# Patient Record
Sex: FEMALE
[De-identification: names, ages, dates, MRNs, and addresses within clinical notes are randomized per-mention and may not be internally consistent; named-entity substitution may affect disease eponyms.]

---

## 2017-10-04 ENCOUNTER — RX ONLY (RX ONLY)
Age: 42
End: 2017-10-04

## 2018-02-04 ENCOUNTER — RX ONLY (RX ONLY)
Age: 43
End: 2018-02-04

## 2021-04-22 ENCOUNTER — APPOINTMENT (RX ONLY)
Dept: URBAN - METROPOLITAN AREA CLINIC 168 | Facility: CLINIC | Age: 46
Setting detail: DERMATOLOGY
End: 2021-04-22

## 2021-04-22 VITALS — TEMPERATURE: 96.8 F

## 2021-04-22 DIAGNOSIS — L70.0 ACNE VULGARIS: ICD-10-CM

## 2021-04-22 DIAGNOSIS — L21.8 OTHER SEBORRHEIC DERMATITIS: ICD-10-CM

## 2021-04-22 PROCEDURE — 99204 OFFICE O/P NEW MOD 45 MIN: CPT

## 2021-04-22 PROCEDURE — ? COUNSELING

## 2021-04-22 PROCEDURE — ? FULL BODY SKIN EXAM - DECLINED

## 2021-04-22 PROCEDURE — ? PRESCRIPTION

## 2021-04-22 PROCEDURE — ? ADDITIONAL NOTES

## 2021-04-22 RX ORDER — HYDROCORTISONE 25 MG/G
CREAM TOPICAL BID
Qty: 1 | Refills: 6 | Status: ERX | COMMUNITY
Start: 2021-04-22

## 2021-04-22 RX ORDER — SODIUM SULFACETAMIDE AND SULFUR 80; 40 MG/ML; MG/ML
LOTION TOPICAL
Qty: 1 | Refills: 11 | Status: ERX | COMMUNITY
Start: 2021-04-22

## 2021-04-22 RX ORDER — KETOCONAZOLE 20 MG/G
CREAM TOPICAL BID
Qty: 1 | Refills: 6 | Status: ERX | COMMUNITY
Start: 2021-04-22

## 2021-04-22 RX ADMIN — KETOCONAZOLE: 20 CREAM TOPICAL at 00:00

## 2021-04-22 RX ADMIN — HYDROCORTISONE: 25 CREAM TOPICAL at 00:00

## 2021-04-22 RX ADMIN — SODIUM SULFACETAMIDE AND SULFUR: 80; 40 LOTION TOPICAL at 00:00

## 2021-04-22 NOTE — HPI: OTHER
Condition:: Seb derm
Please Describe Your Condition:: Eyebrows, around nose and mouth. X years. Uses hydrocortisone butyrate 1%. No help. Has had for years, cream not working. History of eczema and psoriasis.

## 2021-04-22 NOTE — PROCEDURE: ADDITIONAL NOTES
Additional Notes: Patient consent was obtained to proceed with the visit and recommended plan of care after discussion of all risks and benefits, including the risks of COVID-19 exposure.
Detail Level: Simple
Additional Notes: Uses head and shoulders on scalp. Instructed to let head and shoulders to let sit in hair 10 min then rinse out.
Render Risk Assessment In Note?: no

## 2021-04-22 NOTE — PROCEDURE: MIPS QUALITY
Quality 130: Documentation Of Current Medications In The Medical Record: Current Medications Documented
Quality 402: Tobacco Use And Help With Quitting Among Adolescents: Patient screened for tobacco and is an ex-smoker
Detail Level: Detailed
Quality 110: Preventive Care And Screening: Influenza Immunization: Influenza Immunization not Administered for Documented Reasons.
Quality 431: Preventive Care And Screening: Unhealthy Alcohol Use - Screening: Patient screened for unhealthy alcohol use using a single question and scores less than 2 times per year
Quality 226: Preventive Care And Screening: Tobacco Use: Screening And Cessation Intervention: Patient screened for tobacco use and is an ex/non-smoker

## 2021-04-22 NOTE — PROCEDURE: COUNSELING
Detail Level: Detailed
Erythromycin Pregnancy And Lactation Text: This medication is Pregnancy Category B and is considered safe during pregnancy. It is also excreted in breast milk.
Spironolactone Counseling: Patient advised regarding risks of diarrhea, abdominal pain, hyperkalemia, birth defects (for female patients), liver toxicity and renal toxicity. The patient may need blood work to monitor liver and kidney function and potassium levels while on therapy. The patient verbalized understanding of the proper use and possible adverse effects of spironolactone.  All of the patient's questions and concerns were addressed.
Topical Sulfur Applications Pregnancy And Lactation Text: This medication is Pregnancy Category C and has an unknown safety profile during pregnancy. It is unknown if this topical medication is excreted in breast milk.
Bactrim Counseling:  I discussed with the patient the risks of sulfa antibiotics including but not limited to GI upset, allergic reaction, drug rash, diarrhea, dizziness, photosensitivity, and yeast infections.  Rarely, more serious reactions can occur including but not limited to aplastic anemia, agranulocytosis, methemoglobinemia, blood dyscrasias, liver or kidney failure, lung infiltrates or desquamative/blistering drug rashes.
Benzoyl Peroxide Counseling: Patient counseled that medicine may cause skin irritation and bleach clothing.  In the event of skin irritation, the patient was advised to reduce the amount of the drug applied or use it less frequently.   The patient verbalized understanding of the proper use and possible adverse effects of benzoyl peroxide.  All of the patient's questions and concerns were addressed.
High Dose Vitamin A Pregnancy And Lactation Text: High dose vitamin A therapy is contraindicated during pregnancy and breast feeding.
Dapsone Counseling: I discussed with the patient the risks of dapsone including but not limited to hemolytic anemia, agranulocytosis, rashes, methemoglobinemia, kidney failure, peripheral neuropathy, headaches, GI upset, and liver toxicity.  Patients who start dapsone require monitoring including baseline LFTs and weekly CBCs for the first month, then every month thereafter.  The patient verbalized understanding of the proper use and possible adverse effects of dapsone.  All of the patient's questions and concerns were addressed.
Tazorac Counseling:  Patient advised that medication is irritating and drying.  Patient may need to apply sparingly and wash off after an hour before eventually leaving it on overnight.  The patient verbalized understanding of the proper use and possible adverse effects of tazorac.  All of the patient's questions and concerns were addressed.
Topical Sulfur Applications Counseling: Topical Sulfur Counseling: Patient counseled that this medication may cause skin irritation or allergic reactions.  In the event of skin irritation, the patient was advised to reduce the amount of the drug applied or use it less frequently.   The patient verbalized understanding of the proper use and possible adverse effects of topical sulfur application.  All of the patient's questions and concerns were addressed.
Sarecycline Pregnancy And Lactation Text: This medication is Pregnancy Category D and not consider safe during pregnancy. It is also excreted in breast milk.
Erythromycin Counseling:  I discussed with the patient the risks of erythromycin including but not limited to GI upset, allergic reaction, drug rash, diarrhea, increase in liver enzymes, and yeast infections.
Azithromycin Pregnancy And Lactation Text: This medication is considered safe during pregnancy and is also secreted in breast milk.
High Dose Vitamin A Counseling: Side effects reviewed, pt to contact office should one occur.
Birth Control Pills Pregnancy And Lactation Text: This medication should be avoided if pregnant and for the first 30 days post-partum.
Topical Retinoid Pregnancy And Lactation Text: This medication is Pregnancy Category C. It is unknown if this medication is excreted in breast milk.
Sarecycline Counseling: Patient advised regarding possible photosensitivity and discoloration of the teeth, skin, lips, tongue and gums.  Patient instructed to avoid sunlight, if possible.  When exposed to sunlight, patients should wear protective clothing, sunglasses, and sunscreen.  The patient was instructed to call the office immediately if the following severe adverse effects occur:  hearing changes, easy bruising/bleeding, severe headache, or vision changes.  The patient verbalized understanding of the proper use and possible adverse effects of sarecycline.  All of the patient's questions and concerns were addressed.
Use Enhanced Medication Counseling?: No
Azithromycin Counseling:  I discussed with the patient the risks of azithromycin including but not limited to GI upset, allergic reaction, drug rash, diarrhea, and yeast infections.
Doxycycline Pregnancy And Lactation Text: This medication is Pregnancy Category D and not consider safe during pregnancy. It is also excreted in breast milk but is considered safe for shorter treatment courses.
Tetracycline Counseling: Patient counseled regarding possible photosensitivity and increased risk for sunburn.  Patient instructed to avoid sunlight, if possible.  When exposed to sunlight, patients should wear protective clothing, sunglasses, and sunscreen.  The patient was instructed to call the office immediately if the following severe adverse effects occur:  hearing changes, easy bruising/bleeding, severe headache, or vision changes.  The patient verbalized understanding of the proper use and possible adverse effects of tetracycline.  All of the patient's questions and concerns were addressed. Patient understands to avoid pregnancy while on therapy due to potential birth defects.
Topical Clindamycin Pregnancy And Lactation Text: This medication is Pregnancy Category B and is considered safe during pregnancy. It is unknown if it is excreted in breast milk.
Isotretinoin Pregnancy And Lactation Text: This medication is Pregnancy Category X and is considered extremely dangerous during pregnancy. It is unknown if it is excreted in breast milk.
Birth Control Pills Counseling: Birth Control Pill Counseling: I discussed with the patient the potential side effects of OCPs including but not limited to increased risk of stroke, heart attack, thrombophlebitis, deep venous thrombosis, hepatic adenomas, breast changes, GI upset, headaches, and depression.  The patient verbalized understanding of the proper use and possible adverse effects of OCPs. All of the patient's questions and concerns were addressed.
Topical Retinoid counseling:  Patient advised to apply a pea-sized amount only at bedtime and wait 30 minutes after washing their face before applying.  If too drying, patient may add a non-comedogenic moisturizer. The patient verbalized understanding of the proper use and possible adverse effects of retinoids.  All of the patient's questions and concerns were addressed.
Doxycycline Counseling:  Patient counseled regarding possible photosensitivity and increased risk for sunburn.  Patient instructed to avoid sunlight, if possible.  When exposed to sunlight, patients should wear protective clothing, sunglasses, and sunscreen.  The patient was instructed to call the office immediately if the following severe adverse effects occur:  hearing changes, easy bruising/bleeding, severe headache, or vision changes.  The patient verbalized understanding of the proper use and possible adverse effects of doxycycline.  All of the patient's questions and concerns were addressed.
Isotretinoin Counseling: Patient should get monthly blood tests, not donate blood, not drive at night if vision affected, not share medication, and not undergo elective surgery for 6 months after tx completed. Side effects reviewed, pt to contact office should one occur.
Topical Clindamycin Counseling: Patient counseled that this medication may cause skin irritation or allergic reactions.  In the event of skin irritation, the patient was advised to reduce the amount of the drug applied or use it less frequently.   The patient verbalized understanding of the proper use and possible adverse effects of clindamycin.  All of the patient's questions and concerns were addressed.
Spironolactone Pregnancy And Lactation Text: This medication can cause feminization of the male fetus and should be avoided during pregnancy. The active metabolite is also found in breast milk.
Bactrim Pregnancy And Lactation Text: This medication is Pregnancy Category D and is known to cause fetal risk.  It is also excreted in breast milk.
Minocycline Counseling: Patient advised regarding possible photosensitivity and discoloration of the teeth, skin, lips, tongue and gums.  Patient instructed to avoid sunlight, if possible.  When exposed to sunlight, patients should wear protective clothing, sunglasses, and sunscreen.  The patient was instructed to call the office immediately if the following severe adverse effects occur:  hearing changes, easy bruising/bleeding, severe headache, or vision changes.  The patient verbalized understanding of the proper use and possible adverse effects of minocycline.  All of the patient's questions and concerns were addressed.
Benzoyl Peroxide Pregnancy And Lactation Text: This medication is Pregnancy Category C. It is unknown if benzoyl peroxide is excreted in breast milk.
Dapsone Pregnancy And Lactation Text: This medication is Pregnancy Category C and is not considered safe during pregnancy or breast feeding.
Tazorac Pregnancy And Lactation Text: This medication is not safe during pregnancy. It is unknown if this medication is excreted in breast milk.

## 2021-08-12 ENCOUNTER — APPOINTMENT (RX ONLY)
Dept: URBAN - METROPOLITAN AREA CLINIC 100 | Facility: CLINIC | Age: 46
Setting detail: DERMATOLOGY
End: 2021-08-12

## 2021-08-12 DIAGNOSIS — L50.1 IDIOPATHIC URTICARIA: ICD-10-CM

## 2021-08-12 PROCEDURE — ? PRESCRIPTION

## 2021-08-12 PROCEDURE — ? TREATMENT REGIMEN

## 2021-08-12 PROCEDURE — ? COUNSELING

## 2021-08-12 PROCEDURE — 99213 OFFICE O/P EST LOW 20 MIN: CPT

## 2021-08-12 RX ORDER — TRIAMCINOLONE ACETONIDE 1 MG/G
CREAM TOPICAL
Qty: 1 | Refills: 0 | Status: ERX | COMMUNITY
Start: 2021-08-12

## 2021-08-12 RX ADMIN — TRIAMCINOLONE ACETONIDE: 1 CREAM TOPICAL at 00:00

## 2021-08-12 ASSESSMENT — LOCATION SIMPLE DESCRIPTION DERM
LOCATION SIMPLE: RIGHT UPPER ARM
LOCATION SIMPLE: LEFT UPPER BACK
LOCATION SIMPLE: ABDOMEN

## 2021-08-12 ASSESSMENT — LOCATION DETAILED DESCRIPTION DERM
LOCATION DETAILED: SUBXIPHOID
LOCATION DETAILED: LEFT MID-UPPER BACK
LOCATION DETAILED: RIGHT ANTECUBITAL SKIN
LOCATION DETAILED: RIGHT RIB CAGE

## 2021-08-12 ASSESSMENT — LOCATION ZONE DERM
LOCATION ZONE: ARM
LOCATION ZONE: TRUNK

## 2021-08-12 NOTE — PROCEDURE: TREATMENT REGIMEN
Plan: Can take Zyrtec by mouth up to 20 mg daily. Patient should seek an allergist , [facility] or [facility]. Wash body with dove soap bar, all free and clear Plan: Can take Zyrtec by mouth up to 20 mg daily. Patient should seek an allergist , [facility] or [facility]. Wash body with dove soap bar, all free and clear

## 2023-02-01 ENCOUNTER — APPOINTMENT (RX ONLY)
Dept: URBAN - METROPOLITAN AREA CLINIC 168 | Facility: CLINIC | Age: 48
Setting detail: DERMATOLOGY
End: 2023-02-01

## 2023-02-01 DIAGNOSIS — L21.8 OTHER SEBORRHEIC DERMATITIS: ICD-10-CM | Status: INADEQUATELY CONTROLLED

## 2023-02-01 PROCEDURE — ? COUNSELING

## 2023-02-01 PROCEDURE — ? MEDICATION COUNSELING

## 2023-02-01 PROCEDURE — ? FULL BODY SKIN EXAM - DECLINED

## 2023-02-01 PROCEDURE — 99214 OFFICE O/P EST MOD 30 MIN: CPT

## 2023-02-01 PROCEDURE — ? PRESCRIPTION

## 2023-02-01 PROCEDURE — ? PRESCRIPTION MEDICATION MANAGEMENT

## 2023-02-01 RX ORDER — HYDROCORTISONE 25 MG/G
2.5 CREAM TOPICAL BID
Qty: 30 | Refills: 6 | Status: ERX | COMMUNITY
Start: 2023-02-01

## 2023-02-01 RX ORDER — CICLOPIROX 7.7 MG/G
1 GEL TOPICAL BID
Qty: 45 | Refills: 11 | Status: ERX | COMMUNITY
Start: 2023-02-01

## 2023-02-01 RX ADMIN — HYDROCORTISONE 2.5: 25 CREAM TOPICAL at 00:00

## 2023-02-01 RX ADMIN — CICLOPIROX 1: 7.7 GEL TOPICAL at 00:00

## 2023-02-01 ASSESSMENT — LOCATION ZONE DERM
LOCATION ZONE: LIP
LOCATION ZONE: FACE
LOCATION ZONE: SCALP

## 2023-02-01 ASSESSMENT — LOCATION DETAILED DESCRIPTION DERM
LOCATION DETAILED: LEFT CENTRAL BUCCAL CHEEK
LOCATION DETAILED: LEFT INFERIOR MEDIAL MALAR CHEEK
LOCATION DETAILED: LEFT MEDIAL EYEBROW
LOCATION DETAILED: RIGHT LOWER CUTANEOUS LIP
LOCATION DETAILED: RIGHT MEDIAL EYEBROW
LOCATION DETAILED: RIGHT INFERIOR MEDIAL MALAR CHEEK
LOCATION DETAILED: LEFT SUPERIOR PARIETAL SCALP

## 2023-02-01 ASSESSMENT — LOCATION SIMPLE DESCRIPTION DERM
LOCATION SIMPLE: LEFT CHEEK
LOCATION SIMPLE: RIGHT CHEEK
LOCATION SIMPLE: RIGHT EYEBROW
LOCATION SIMPLE: LEFT EYEBROW
LOCATION SIMPLE: SCALP
LOCATION SIMPLE: RIGHT LIP

## 2023-02-01 NOTE — PROCEDURE: PRESCRIPTION MEDICATION MANAGEMENT
Render In Strict Bullet Format?: No
Detail Level: Zone
Plan: Head & Shoulders shampoo for scalp/ears (currently using)
Initiate Treatment: Ciclopirox gel applied to face bid\\nHydrocortisone 2.5% cream applied bid x2 wks
Discontinue Regimen: Ketoconazole - breakouts on face while using

## 2023-02-01 NOTE — PROCEDURE: MEDICATION COUNSELING
negative - no pain, no limited range of motion Doxepin Counseling:  Patient advised that the medication is sedating and not to drive a car after taking this medication. Patient informed of potential adverse effects including but not limited to dry mouth, urinary retention, and blurry vision.  The patient verbalized understanding of the proper use and possible adverse effects of doxepin.  All of the patient's questions and concerns were addressed.

## 2023-08-15 ENCOUNTER — APPOINTMENT (RX ONLY)
Dept: URBAN - METROPOLITAN AREA CLINIC 168 | Facility: CLINIC | Age: 48
Setting detail: DERMATOLOGY
End: 2023-08-15

## 2023-08-15 DIAGNOSIS — L81.1 CHLOASMA: ICD-10-CM | Status: INADEQUATELY CONTROLLED

## 2023-08-15 DIAGNOSIS — L82.1 OTHER SEBORRHEIC KERATOSIS: ICD-10-CM | Status: INADEQUATELY CONTROLLED

## 2023-08-15 PROCEDURE — ? MEDICATION COUNSELING

## 2023-08-15 PROCEDURE — ? ADDITIONAL NOTES

## 2023-08-15 PROCEDURE — ? PRESCRIPTION MEDICATION MANAGEMENT

## 2023-08-15 PROCEDURE — ? FULL BODY SKIN EXAM - DECLINED

## 2023-08-15 PROCEDURE — ? PRESCRIPTION

## 2023-08-15 PROCEDURE — ? COUNSELING

## 2023-08-15 PROCEDURE — 99214 OFFICE O/P EST MOD 30 MIN: CPT

## 2023-08-15 PROCEDURE — ? SUNSCREEN RECOMMENDATIONS

## 2023-08-15 RX ORDER — HYDROQUINONE 4 %
1 CREAM (GRAM) TOPICAL QHS
Qty: 30 | Refills: 2 | Status: ERX | COMMUNITY
Start: 2023-08-15

## 2023-08-15 RX ADMIN — Medication 1: at 00:00

## 2023-08-15 ASSESSMENT — LOCATION DETAILED DESCRIPTION DERM
LOCATION DETAILED: RIGHT INFERIOR CENTRAL MALAR CHEEK
LOCATION DETAILED: LEFT INFERIOR CENTRAL MALAR CHEEK
LOCATION DETAILED: RIGHT SUPERIOR CENTRAL MALAR CHEEK
LOCATION DETAILED: LEFT SUPERIOR CENTRAL MALAR CHEEK
LOCATION DETAILED: RIGHT MEDIAL FOREHEAD

## 2023-08-15 ASSESSMENT — LOCATION SIMPLE DESCRIPTION DERM
LOCATION SIMPLE: LEFT CHEEK
LOCATION SIMPLE: RIGHT FOREHEAD
LOCATION SIMPLE: RIGHT CHEEK

## 2023-08-15 ASSESSMENT — LOCATION ZONE DERM: LOCATION ZONE: FACE

## 2023-08-15 ASSESSMENT — SEVERITY ASSESSMENT: SEVERITY: MODERATE, MODERATELY DARKER THAN THE SURROUNDING NORMAL SKIN

## 2023-08-15 NOTE — PROCEDURE: MEDICATION COUNSELING
Tetracycline Pregnancy And Lactation Text: This medication is Pregnancy Category D and not consider safe during pregnancy. It is also excreted in breast milk. Isotretinoin Counseling: Patient should get monthly blood tests, not donate blood, not drive at night if vision affected, not share medication, and not undergo elective surgery for 6 months after tx completed. Side effects reviewed, pt to contact office should one occur.

## 2023-08-15 NOTE — PROCEDURE: PRESCRIPTION MEDICATION MANAGEMENT
Plan: Vitamin C Serum applied qam
Render In Strict Bullet Format?: No
Initiate Treatment: Hydroquinone 4% cream applied qhs x3 months
Detail Level: Zone

## 2023-08-15 NOTE — PROCEDURE: MEDICATION COUNSELING
No lymphadedenopathy Topical Steroids Counseling: I discussed with the patient that prolonged use of topical steroids can result in the increased appearance of superficial blood vessels (telangiectasias), lightening (hypopigmentation) and thinning of the skin (atrophy).  Patient understands to avoid using high potency steroids in skin folds, the groin or the face.  The patient verbalized understanding of the proper use and possible adverse effects of topical steroids.  All of the patient's questions and concerns were addressed.

## 2023-08-15 NOTE — PROCEDURE: SUNSCREEN RECOMMENDATIONS

## 2023-08-15 NOTE — PROCEDURE: MEDICATION COUNSELING
Confirmed, thank you so much     Cephalexin Counseling: I counseled the patient regarding use of cephalexin as an antibiotic for prophylactic and/or therapeutic purposes. Cephalexin (commonly prescribed under brand name Keflex) is a cephalosporin antibiotic which is active against numerous classes of bacteria, including most skin bacteria. Side effects may include nausea, diarrhea, gastrointestinal upset, rash, hives, yeast infections, and in rare cases, hepatitis, kidney disease, seizures, fever, confusion, neurologic symptoms, and others. Patients with severe allergies to penicillin medications are cautioned that there is about a 10% incidence of cross-reactivity with cephalosporins. When possible, patients with penicillin allergies should use alternatives to cephalosporins for antibiotic therapy.

## 2023-08-15 NOTE — PROCEDURE: ADDITIONAL NOTES
Render Risk Assessment In Note?: yes
Additional Notes: Discussed that if treatment is wanted she could have either the Cool Peel (3-6 treatments) vs electrocautery.
Detail Level: Simple

## 2023-08-31 NOTE — PROCEDURE: MEDICATION COUNSELING
Hydroxychloroquine Pregnancy And Lactation Text: This medication has been shown to cause fetal harm but it isn't assigned a Pregnancy Risk Category. There are small amounts excreted in breast milk. A-T Advancement Flap Text: The defect edges were debeveled with a #15 scalpel blade.  Given the location of the defect, shape of the defect and the proximity to free margins an A-T advancement flap was deemed most appropriate.  Using a sterile surgical marker, an appropriate advancement flap was drawn incorporating the defect and placing the expected incisions within the relaxed skin tension lines where possible.    The area thus outlined was incised deep to adipose tissue with a #15 scalpel blade.  The skin margins were undermined to an appropriate distance in all directions utilizing iris scissors and carried over to close the primary defect.

## 2024-04-04 ENCOUNTER — RX ONLY (RX ONLY)
Age: 49
End: 2024-04-04

## 2024-09-17 NOTE — PROCEDURE: MEDICATION COUNSELING
Facial Plastic & Reconstructive Surgery    9/18/24  POV s/p 2/29/24 forehead flap takedown; Has previously undergone OCRFF, PMFF, autologous rib grafting, auricular cartilage grafting for reconstruction.  Telehealth visit 4/29/24 plan: Discussed Full thickness skin graft to nasal vestibule given delayed healing in attempt to prevent further wound breakdown    Doing well, swelling improved  C/o right nasal pain  Left ala wound previously seen now healing very well.   Using saline daily  Incisions c/d/I    We discussed CT sinus w/ contrast for surveillance of disease   Plan: RTC in 3 months for check      ---------------------------  6/13/24  POV s/p 2/29/24 forehead flap takedown; Has previously undergone OCRFF, PMFF, autologous rib grafting, auricular cartilage grafting for reconstruction.  Telehealth visit 4/29/24 plan: Discussed Full thickness skin graft to nasal vestibule given delayed healing in attempt to prevent further wound breakdown    Doing well, swelling improved  Left ala wound previously seen now healing very well.   Using saline and medihoney daily  Incisions c/d/I    Plan: RTC in 3 months for check   Valtrex Counseling: I discussed with the patient the risks of valacyclovir including but not limited to kidney damage, nausea, vomiting and severe allergy.  The patient understands that if the infection seems to be worsening or is not improving, they are to call.

## 2025-02-21 ENCOUNTER — RX ONLY (RX ONLY)
Age: 50
End: 2025-02-21

## 2025-02-21 ENCOUNTER — APPOINTMENT (OUTPATIENT)
Dept: URBAN - METROPOLITAN AREA CLINIC 168 | Facility: CLINIC | Age: 50
Setting detail: DERMATOLOGY
End: 2025-02-21

## 2025-02-21 DIAGNOSIS — D22 MELANOCYTIC NEVI: ICD-10-CM | Status: STABLE

## 2025-02-21 DIAGNOSIS — Z12.83 ENCOUNTER FOR SCREENING FOR MALIGNANT NEOPLASM OF SKIN: ICD-10-CM

## 2025-02-21 DIAGNOSIS — L81.3 CAFÉ AU LAIT SPOTS: ICD-10-CM

## 2025-02-21 DIAGNOSIS — L71.8 OTHER ROSACEA: ICD-10-CM | Status: INADEQUATELY CONTROLLED

## 2025-02-21 DIAGNOSIS — L81.4 OTHER MELANIN HYPERPIGMENTATION: ICD-10-CM

## 2025-02-21 DIAGNOSIS — L81.1 CHLOASMA: ICD-10-CM | Status: STABLE

## 2025-02-21 DIAGNOSIS — L73.9 FOLLICULAR DISORDER, UNSPECIFIED: ICD-10-CM

## 2025-02-21 DIAGNOSIS — L82.1 OTHER SEBORRHEIC KERATOSIS: ICD-10-CM

## 2025-02-21 DIAGNOSIS — D18.0 HEMANGIOMA: ICD-10-CM

## 2025-02-21 PROBLEM — D22.5 MELANOCYTIC NEVI OF TRUNK: Status: ACTIVE | Noted: 2025-02-21

## 2025-02-21 PROBLEM — D18.01 HEMANGIOMA OF SKIN AND SUBCUTANEOUS TISSUE: Status: ACTIVE | Noted: 2025-02-21

## 2025-02-21 PROBLEM — L02.422 FURUNCLE OF LEFT AXILLA: Status: ACTIVE | Noted: 2025-02-21

## 2025-02-21 PROBLEM — D22.71 MELANOCYTIC NEVI OF RIGHT LOWER LIMB, INCLUDING HIP: Status: ACTIVE | Noted: 2025-02-21

## 2025-02-21 PROCEDURE — ? PRESCRIPTION

## 2025-02-21 PROCEDURE — ? PRESCRIPTION MEDICATION MANAGEMENT

## 2025-02-21 PROCEDURE — ? FULL BODY SKIN EXAM

## 2025-02-21 PROCEDURE — ? TREATMENT REGIMEN

## 2025-02-21 PROCEDURE — ? COUNSELING

## 2025-02-21 PROCEDURE — 99214 OFFICE O/P EST MOD 30 MIN: CPT

## 2025-02-21 RX ORDER — CLINDAMYCIN PHOSPHATE 10 MG/ML
DAB LOTION TOPICAL QD
Qty: 60 | Refills: 2 | Status: ERX | COMMUNITY
Start: 2025-02-21

## 2025-02-21 RX ORDER — CLINDAMYCIN PHOSPHATE 10 MG/ML
DAB LOTION TOPICAL QD
Qty: 60 | Refills: 2 | Status: ERX

## 2025-02-21 RX ORDER — METRONIDAZOLE 7.5 MG/G
DAB CREAM TOPICAL TWICE DAILY
Qty: 45 | Refills: 6 | Status: ERX | COMMUNITY
Start: 2025-02-21

## 2025-02-21 RX ORDER — METRONIDAZOLE 7.5 MG/G
DAB CREAM TOPICAL TWICE DAILY
Qty: 45 | Refills: 6 | Status: ERX

## 2025-02-21 RX ORDER — PHARMACY COMPOUNDING ACCESSORY
DAB EACH MISCELLANEOUS
Qty: 15 | Refills: 2 | Status: ERX | COMMUNITY
Start: 2025-02-21

## 2025-02-21 RX ADMIN — METRONIDAZOLE DAB: 7.5 CREAM TOPICAL at 00:00

## 2025-02-21 RX ADMIN — CLINDAMYCIN PHOSPHATE DAB: 10 LOTION TOPICAL at 00:00

## 2025-02-21 RX ADMIN — Medication DAB: at 00:00

## 2025-02-21 ASSESSMENT — LOCATION SIMPLE DESCRIPTION DERM
LOCATION SIMPLE: RIGHT POPLITEAL SKIN
LOCATION SIMPLE: RIGHT CHEEK
LOCATION SIMPLE: LEFT FOREARM
LOCATION SIMPLE: LEFT UPPER BACK
LOCATION SIMPLE: CHIN
LOCATION SIMPLE: LEFT CHEEK
LOCATION SIMPLE: LEFT AXILLARY VAULT
LOCATION SIMPLE: RIGHT FOREARM
LOCATION SIMPLE: ABDOMEN
LOCATION SIMPLE: RIGHT FOREHEAD

## 2025-02-21 ASSESSMENT — LOCATION DETAILED DESCRIPTION DERM
LOCATION DETAILED: RIGHT INFERIOR CENTRAL MALAR CHEEK
LOCATION DETAILED: LEFT DISTAL DORSAL FOREARM
LOCATION DETAILED: EPIGASTRIC SKIN
LOCATION DETAILED: LEFT AXILLARY VAULT
LOCATION DETAILED: LEFT INFERIOR CENTRAL MALAR CHEEK
LOCATION DETAILED: RIGHT MEDIAL MALAR CHEEK
LOCATION DETAILED: LEFT INFERIOR MEDIAL MALAR CHEEK
LOCATION DETAILED: LEFT INFERIOR UPPER BACK
LOCATION DETAILED: LEFT CHIN
LOCATION DETAILED: RIGHT DISTAL DORSAL FOREARM
LOCATION DETAILED: RIGHT POPLITEAL SKIN
LOCATION DETAILED: PERIUMBILICAL SKIN
LOCATION DETAILED: RIGHT MEDIAL FOREHEAD

## 2025-02-21 ASSESSMENT — LOCATION ZONE DERM
LOCATION ZONE: TRUNK
LOCATION ZONE: AXILLAE
LOCATION ZONE: ARM
LOCATION ZONE: FACE
LOCATION ZONE: LEG

## 2025-02-21 ASSESSMENT — SEVERITY ASSESSMENT OVERALL AMONG ALL PATIENTS
IN YOUR EXPERIENCE, AMONG ALL PATIENTS YOU HAVE SEEN WITH THIS CONDITION, HOW SEVERE IS THIS PATIENT'S CONDITION?: MILD TO MODERATE

## 2025-02-21 ASSESSMENT — SEVERITY ASSESSMENT: SEVERITY: MILD

## 2025-02-21 ASSESSMENT — BSA RASH: BSA RASH: 1

## 2025-02-21 NOTE — PROCEDURE: PRESCRIPTION MEDICATION MANAGEMENT
Render In Strict Bullet Format?: No
Initiate Treatment: Delight cream: (HQ USP 6%, KA USP 2%, Vit C USP 2.5%, Tretinoin USP 0.05%, HC USP 1%, HA 0.1%) Apply thin layer to face above upper lip QOHS for one week, then QHS as tolerated x3 months, take one month off, repeat prn
Detail Level: Zone
Detail Level: Detailed
Initiate Treatment: Metronidazole 0.75% cream Apply thin layer to face twice daily
Initiate Treatment: Clindamycin phosphate 1% topical lotion apply under the armpits daily as needed
Plan: Wash armpits with Hibiclens daily

## 2025-05-05 ENCOUNTER — APPOINTMENT (OUTPATIENT)
Dept: URBAN - METROPOLITAN AREA CLINIC 144 | Facility: CLINIC | Age: 50
Setting detail: DERMATOLOGY
End: 2025-05-05

## 2025-05-05 DIAGNOSIS — Z85.3 PERSONAL HISTORY OF MALIGNANT NEOPLASM OF BREAST: ICD-10-CM

## 2025-05-05 PROCEDURE — ? ORDER FOR SURGERY

## 2025-05-05 PROCEDURE — ? COUNSELING -  BREAST RECONSTRUCTION

## 2025-05-05 PROCEDURE — 99212 OFFICE O/P EST SF 10 MIN: CPT

## 2025-05-05 ASSESSMENT — LOCATION DETAILED DESCRIPTION DERM
LOCATION DETAILED: LEFT MEDIAL BREAST 8-9:00 REGION
LOCATION DETAILED: RIGHT LATERAL BREAST 7-8:00 REGION

## 2025-05-05 ASSESSMENT — LOCATION ZONE DERM: LOCATION ZONE: TRUNK

## 2025-05-05 ASSESSMENT — LOCATION SIMPLE DESCRIPTION DERM
LOCATION SIMPLE: LEFT BREAST
LOCATION SIMPLE: RIGHT BREAST

## 2025-05-05 NOTE — PROCEDURE: ORDER FOR SURGERY
Detail Level: Simple
Time Frame Unit: day(s)
Comments (Optional): 1 week pre op\\nDrain sheet\\n1 week post op\\nCPT codes: 19318 x 2
Surgery To Be Ordered: Bilateral breast reduction
Priority: normal
Facility: choice
Photos Taken?: yes
Provider: Edi Figueroa MD
Estimated Length Of Surgery: 4 hours

## 2025-05-05 NOTE — HPI: BREAST RECONSTRUCTION CONSULTATION
Too Large Or Small?: too large
What Is Your Pre-Diagnosis Bra Size (Numeric)?: 38
Which Side(S)?: both breasts
How Severe Are Your Concerns?: severe
Is This A New Presentation, Or A Follow-Up?: Breast Reconstruction Consultation
Who Is Your Surgical Oncologist (If Known)?: Dr. Brooks
Who Is Your Medical Oncologist (If Known)?: Dr. Payne
When Was The Diagnosis Confirmed?: 2023
Statement Selected

## 2025-05-13 ENCOUNTER — RX ONLY (RX ONLY)
Age: 50
End: 2025-05-13

## 2025-05-13 RX ORDER — HYDROQUINONE 4 %
DAB CREAM (GRAM) TOPICAL QHS
Qty: 30 | Refills: 1 | Status: ERX

## 2025-08-22 ENCOUNTER — APPOINTMENT (OUTPATIENT)
Dept: URBAN - METROPOLITAN AREA CLINIC 144 | Facility: CLINIC | Age: 50
Setting detail: DERMATOLOGY
End: 2025-08-22

## 2025-08-22 DIAGNOSIS — Z85.3 PERSONAL HISTORY OF MALIGNANT NEOPLASM OF BREAST: ICD-10-CM

## 2025-08-22 PROCEDURE — ? COUNSELING -  BREAST RECONSTRUCTION

## 2025-08-22 PROCEDURE — ? PRE-OP WORKLIST

## 2025-08-22 PROCEDURE — ?

## 2025-08-22 ASSESSMENT — LOCATION SIMPLE DESCRIPTION DERM
LOCATION SIMPLE: RIGHT BREAST
LOCATION SIMPLE: LEFT BREAST

## 2025-08-22 ASSESSMENT — LOCATION ZONE DERM: LOCATION ZONE: TRUNK

## 2025-08-25 ENCOUNTER — RX ONLY (RX ONLY)
Age: 50
End: 2025-08-25

## 2025-08-25 RX ORDER — CEPHALEXIN 500 MG/1
CAPSULE ORAL
Qty: 14 | Refills: 0 | Status: ERX | COMMUNITY
Start: 2025-08-25

## 2025-08-25 RX ORDER — OXYCODONE HYDROCHLORIDE AND ACETAMINOPHEN 5; 325 MG/1; MG/1
TABLET ORAL
Qty: 28 | Refills: 0 | Status: ERX | COMMUNITY
Start: 2025-08-25

## 2025-08-25 RX ORDER — ONDANSETRON 4 MG/1
TABLET, FILM COATED ORAL
Qty: 9 | Refills: 0 | Status: ERX | COMMUNITY
Start: 2025-08-25

## 2025-08-28 ENCOUNTER — RX ONLY (RX ONLY)
Age: 50
End: 2025-08-28

## 2025-08-28 ENCOUNTER — APPOINTMENT (OUTPATIENT)
Dept: URBAN - METROPOLITAN AREA CLINIC 144 | Facility: CLINIC | Age: 50
Setting detail: DERMATOLOGY
End: 2025-08-28

## 2025-08-28 DIAGNOSIS — Z48.89 ENCOUNTER FOR OTHER SPECIFIED SURGICAL AFTERCARE: ICD-10-CM

## 2025-08-28 PROCEDURE — ?

## 2025-08-28 PROCEDURE — ? COUNSELING - POST-OP CHECK, BREAST REDUCTION

## 2025-08-28 RX ORDER — KETOROLAC TROMETHAMINE 10 MG/1
TABLET, FILM COATED ORAL
Qty: 20 | Refills: 0 | Status: ERX | COMMUNITY
Start: 2025-08-28